# Patient Record
Sex: FEMALE | Race: WHITE
[De-identification: names, ages, dates, MRNs, and addresses within clinical notes are randomized per-mention and may not be internally consistent; named-entity substitution may affect disease eponyms.]

---

## 2018-02-14 ENCOUNTER — HOSPITAL ENCOUNTER (OUTPATIENT)
Dept: HOSPITAL 88 - CT | Age: 83
End: 2018-02-14
Payer: MEDICARE

## 2018-02-14 DIAGNOSIS — R27.9: Primary | ICD-10-CM

## 2018-02-14 DIAGNOSIS — H40.89: ICD-10-CM

## 2018-02-14 DIAGNOSIS — H54.62: ICD-10-CM

## 2018-02-14 PROCEDURE — 70450 CT HEAD/BRAIN W/O DYE: CPT

## 2018-02-14 PROCEDURE — 70480 CT ORBIT/EAR/FOSSA W/O DYE: CPT

## 2018-02-14 NOTE — DIAGNOSTIC IMAGING REPORT
EXAMINATION: CT of the orbits without contrast



CLINICAL HISTORY: Loss of vision left eye, evaluate for stroke, unsteady gait

COMPARISON:Head CT performed on the same day

TECHNIQUE: The orbits were scanned utilizing a multidetector helical scanner

from the skull base to the vertex. Multiplanar sagittal and coronal

reconstructions were performed as well.



FINDINGS:



     Globes: Normal

     Optic nerves: Normal size. 

     Orbital fat: Normal.

     Extraocular muscles: Normal.

     Preseptal soft tissues: Normal.

     Lacrimal glands: Normal.

     Sella turcica: No enlargement.

     Cavernous sinuses: No abnormalities in this unenhanced study.

     Paranasal sinuses: Clear.

     Superior ophthalmic veins: Grossly normal abnormalities, no enlarged





IMPRESSION:



No orbits abnormalities to explain the patient's symptoms.





Signed by: Dr. Merry Vazquez M.D. on 2/14/2018 2:11 PM

## 2018-02-14 NOTE — DIAGNOSTIC IMAGING REPORT
EXAMINATION: Head CT 



HISTORY: Loss of vision left eye, evaluate for stroke, unsteady gait

COMPARISON: None.

TECHNIQUE: Multidetector axial images were obtained without contrast from the

foramen magnum to the vertex . The images were reconstructed using brain and

bone algorithms.  Thin section brain images were reformatted into coronal and

sagittal planes.

Intravenous contrast: None. 

Motion/streaking artifact limits the evaluation of the skull base and posterior

cranial fossa.



FINDINGS:



     Parenchyma: 

1.  Severe confluent periventricular, corona radiata and centrum semiovale

white matter hypodensities, most likely nonspecific microvascular ischemic

changes.

2.  No mass or hemorrhage. No CT evidence of acute territorial vascular insult.



    

     Extra-axial spaces:No abnormal density. No extra-axial fluid collections 

     Brain volume: Normal for age. 

     Ventricles: No hydrocephalus or displacement.  

     Arteries: No density suggestive of thrombus. 

     Dural sinuses: No abnormal density. 

     Extra-axial spaces: No abnormal density. 

     Foramen magnum: No mass, Chiari malformation, or basilar invagination. 

     Sella: No obvious mass.  

     Paranasal/mastoid sinuses: Imaged portions unremarkable. 

     Skull/Scalp: No lytic or blastic lesions.  No fractures. 



IMPRESSION:



1.  No  acute intracranial hemorrhage or cortical infarcts.



2.  Severe confluent white matter chronic microvascular ischemic changes,

superimposed acute subcortical infarct cannot be excluded. If clinical concern

consider brain MRI for further evaluation.



Signed by: Dr. Merry Vazquez M.D. on 2/14/2018 2:08 PM

## 2020-03-30 ENCOUNTER — HOSPITAL ENCOUNTER (INPATIENT)
Dept: HOSPITAL 88 - ER | Age: 85
LOS: 4 days | Discharge: NURSING, REHAB OR LONG-TERM CARE | DRG: 689 | End: 2020-04-03
Attending: INTERNAL MEDICINE | Admitting: INTERNAL MEDICINE
Payer: MEDICARE

## 2020-03-30 VITALS — DIASTOLIC BLOOD PRESSURE: 85 MMHG | SYSTOLIC BLOOD PRESSURE: 195 MMHG

## 2020-03-30 VITALS — DIASTOLIC BLOOD PRESSURE: 80 MMHG | SYSTOLIC BLOOD PRESSURE: 140 MMHG

## 2020-03-30 VITALS — WEIGHT: 147 LBS | HEIGHT: 60 IN | BODY MASS INDEX: 28.86 KG/M2

## 2020-03-30 VITALS — DIASTOLIC BLOOD PRESSURE: 93 MMHG | SYSTOLIC BLOOD PRESSURE: 189 MMHG

## 2020-03-30 VITALS — SYSTOLIC BLOOD PRESSURE: 189 MMHG | DIASTOLIC BLOOD PRESSURE: 93 MMHG

## 2020-03-30 VITALS — SYSTOLIC BLOOD PRESSURE: 195 MMHG | DIASTOLIC BLOOD PRESSURE: 85 MMHG

## 2020-03-30 VITALS — SYSTOLIC BLOOD PRESSURE: 194 MMHG | DIASTOLIC BLOOD PRESSURE: 93 MMHG

## 2020-03-30 DIAGNOSIS — I11.0: ICD-10-CM

## 2020-03-30 DIAGNOSIS — N39.0: Primary | ICD-10-CM

## 2020-03-30 DIAGNOSIS — F03.91: ICD-10-CM

## 2020-03-30 DIAGNOSIS — G93.41: ICD-10-CM

## 2020-03-30 DIAGNOSIS — F29: ICD-10-CM

## 2020-03-30 DIAGNOSIS — I50.22: ICD-10-CM

## 2020-03-30 LAB
ALBUMIN SERPL-MCNC: 4.3 G/DL (ref 3.5–5)
ALBUMIN/GLOB SERPL: 1.3 {RATIO} (ref 0.8–2)
ALP SERPL-CCNC: 103 IU/L (ref 40–150)
ALT SERPL-CCNC: 11 IU/L (ref 0–55)
ANION GAP SERPL CALC-SCNC: 13.4 MMOL/L (ref 8–16)
BACTERIA URNS QL MICRO: (no result) /HPF
BASOPHILS # BLD AUTO: 0 10*3/UL (ref 0–0.1)
BASOPHILS NFR BLD AUTO: 0.3 % (ref 0–1)
BILIRUB UR QL: NEGATIVE
BUN SERPL-MCNC: 13 MG/DL (ref 7–26)
BUN/CREAT SERPL: 15 (ref 6–25)
CALCIUM SERPL-MCNC: 9.5 MG/DL (ref 8.4–10.2)
CHLORIDE SERPL-SCNC: 97 MMOL/L (ref 98–107)
CLARITY UR: (no result)
CO2 SERPL-SCNC: 28 MMOL/L (ref 22–29)
COLOR UR: YELLOW
DEPRECATED APTT PLAS QN: 35 SECONDS (ref 23.8–35.5)
DEPRECATED INR PLAS: 0.96
DEPRECATED NEUTROPHILS # BLD AUTO: 11 10*3/UL (ref 2.1–6.9)
DEPRECATED RBC URNS MANUAL-ACNC: (no result) /HPF (ref 0–5)
EGFRCR SERPLBLD CKD-EPI 2021: > 60 ML/MIN (ref 60–?)
EOSINOPHIL # BLD AUTO: 0 10*3/UL (ref 0–0.4)
EOSINOPHIL NFR BLD AUTO: 0.2 % (ref 0–6)
EPI CELLS URNS QL MICRO: (no result) /LPF
ERYTHROCYTE [DISTWIDTH] IN CORD BLOOD: 13.5 % (ref 11.7–14.4)
GLOBULIN PLAS-MCNC: 3.2 G/DL (ref 2.3–3.5)
GLUCOSE SERPLBLD-MCNC: 98 MG/DL (ref 74–118)
HCT VFR BLD AUTO: 42.4 % (ref 34.2–44.1)
HGB BLD-MCNC: 13.7 G/DL (ref 12–16)
KETONES UR QL STRIP.AUTO: (no result)
LEUKOCYTE ESTERASE UR QL STRIP.AUTO: (no result)
LYMPHOCYTES # BLD: 1.4 10*3/UL (ref 1–3.2)
LYMPHOCYTES NFR BLD AUTO: 10.3 % (ref 18–39.1)
MCH RBC QN AUTO: 28.6 PG (ref 28–32)
MCHC RBC AUTO-ENTMCNC: 32.3 G/DL (ref 31–35)
MCV RBC AUTO: 88.5 FL (ref 81–99)
MONOCYTES # BLD AUTO: 0.8 10*3/UL (ref 0.2–0.8)
MONOCYTES NFR BLD AUTO: 5.7 % (ref 4.4–11.3)
NEUTS SEG NFR BLD AUTO: 83 % (ref 38.7–80)
NITRITE UR QL STRIP.AUTO: POSITIVE
PLATELET # BLD AUTO: 240 X10E3/UL (ref 140–360)
POTASSIUM SERPL-SCNC: 4.4 MMOL/L (ref 3.5–5.1)
PROT UR QL STRIP.AUTO: (no result)
PROTHROMBIN TIME: 13.3 SECONDS (ref 11.9–14.5)
RBC # BLD AUTO: 4.79 X10E6/UL (ref 3.6–5.1)
SODIUM SERPL-SCNC: 134 MMOL/L (ref 136–145)
SP GR UR STRIP: 1.02 (ref 1.01–1.02)
UROBILINOGEN UR STRIP-MCNC: 0.2 MG/DL (ref 0.2–1)

## 2020-03-30 PROCEDURE — 73522 X-RAY EXAM HIPS BI 3-4 VIEWS: CPT

## 2020-03-30 PROCEDURE — 84443 ASSAY THYROID STIM HORMONE: CPT

## 2020-03-30 PROCEDURE — 93306 TTE W/DOPPLER COMPLETE: CPT

## 2020-03-30 PROCEDURE — 87040 BLOOD CULTURE FOR BACTERIA: CPT

## 2020-03-30 PROCEDURE — 99251: CPT

## 2020-03-30 PROCEDURE — 99284 EMERGENCY DEPT VISIT MOD MDM: CPT

## 2020-03-30 PROCEDURE — 87086 URINE CULTURE/COLONY COUNT: CPT

## 2020-03-30 PROCEDURE — 80048 BASIC METABOLIC PNL TOTAL CA: CPT

## 2020-03-30 PROCEDURE — 83735 ASSAY OF MAGNESIUM: CPT

## 2020-03-30 PROCEDURE — 84100 ASSAY OF PHOSPHORUS: CPT

## 2020-03-30 PROCEDURE — 72131 CT LUMBAR SPINE W/O DYE: CPT

## 2020-03-30 PROCEDURE — 83880 ASSAY OF NATRIURETIC PEPTIDE: CPT

## 2020-03-30 PROCEDURE — 36415 COLL VENOUS BLD VENIPUNCTURE: CPT

## 2020-03-30 PROCEDURE — 85025 COMPLETE CBC W/AUTO DIFF WBC: CPT

## 2020-03-30 PROCEDURE — 72192 CT PELVIS W/O DYE: CPT

## 2020-03-30 PROCEDURE — 85610 PROTHROMBIN TIME: CPT

## 2020-03-30 PROCEDURE — 85730 THROMBOPLASTIN TIME PARTIAL: CPT

## 2020-03-30 PROCEDURE — 80053 COMPREHEN METABOLIC PANEL: CPT

## 2020-03-30 PROCEDURE — 83036 HEMOGLOBIN GLYCOSYLATED A1C: CPT

## 2020-03-30 PROCEDURE — 87186 SC STD MICRODIL/AGAR DIL: CPT

## 2020-03-30 PROCEDURE — 96360 HYDRATION IV INFUSION INIT: CPT

## 2020-03-30 PROCEDURE — 81001 URINALYSIS AUTO W/SCOPE: CPT

## 2020-03-30 PROCEDURE — 97139 UNLISTED THERAPEUTIC PX: CPT

## 2020-03-30 PROCEDURE — 96361 HYDRATE IV INFUSION ADD-ON: CPT

## 2020-03-30 PROCEDURE — 71045 X-RAY EXAM CHEST 1 VIEW: CPT

## 2020-03-30 RX ADMIN — SIMVASTATIN SCH MG: 40 TABLET, FILM COATED ORAL at 21:30

## 2020-03-30 RX ADMIN — LORAZEPAM PRN MG: 2 INJECTION INTRAMUSCULAR; INTRAVENOUS at 18:35

## 2020-03-30 RX ADMIN — METOPROLOL TARTRATE SCH MG: 25 TABLET, FILM COATED ORAL at 21:30

## 2020-03-30 RX ADMIN — CARBIDOPA AND LEVODOPA SCH EA: 10; 100 TABLET ORAL at 14:17

## 2020-03-30 RX ADMIN — CEFTRIAXONE SCH MLS/HR: 100 INJECTION, POWDER, FOR SOLUTION INTRAVENOUS at 13:15

## 2020-03-30 RX ADMIN — Medication SCH DROP: at 21:30

## 2020-03-30 RX ADMIN — SODIUM CHLORIDE SCH MLS/HR: 9 INJECTION, SOLUTION INTRAVENOUS at 09:00

## 2020-03-30 RX ADMIN — METOPROLOL TARTRATE SCH MG: 25 TABLET, FILM COATED ORAL at 13:15

## 2020-03-30 RX ADMIN — SODIUM CHLORIDE SCH MLS/HR: 9 INJECTION, SOLUTION INTRAVENOUS at 22:11

## 2020-03-30 RX ADMIN — CARBIDOPA AND LEVODOPA SCH EA: 10; 100 TABLET ORAL at 21:30

## 2020-03-30 NOTE — DIAGNOSTIC IMAGING REPORT
EXAM: CT Pelvis WITHOUT contrast  

INDICATION: r/o fracture



COMPARISON: None.

TECHNIQUE: Pelvis were scanned utilizing a multidetector helical scanner from

the iliac crest to the pubic symphysis without administration of IV contrast.

Coronal and sagittal reformations were obtained. Routine protocol was

performed. 

     IV CONTRAST: None

     ORAL CONTRAST: Water

            

COMPLICATIONS: None



RADIATION DOSE:

     Total DLP: 1058.87 mGy*cm

     Estimated effective dose: (DLP x 0.015 x size factor) mSv

     CTDIvol has been reviewed. It is below the limits set by the Radiation

Protocol Committee (RPC).



FINDINGS:



LINES and TUBES: There is a Roman catheter in place.



GI TRACT: No abnormal distention, wall thickening, or evidence of bowel

obstruction.



PELVIC ORGANS/BLADDER: Calcified leiomyomatous uterus. A pessary device is in

place.



LYMPH NODES: No lymphadenopathy.



VESSELS: Aortobiiliac stent in place.



PERITONEUM / RETROPERITONEUM: No free air or fluid.



BONES: There is a segmental mildly displaced fracture of the right inferior

pubic ramus/ischium with half shaft width lateral displacement of the fracture

fragment.



SOFT TISSUES: Unremarkable.            



IMPRESSION: 

Mildly displaced fracture of the right inferior pubic ramus.



Signed by: Erich Pereyra MD on 3/30/2020 8:28 PM

## 2020-03-30 NOTE — NUR
Patient arrived to the unit @ 1012 from the ER via stretcher.  Patient in stable condition, 
no s/s of distress. No complaints of pain. Air pump applied. Upon assessment heel protectors 
applied bilaterally. Bilateral bruising noted to the hands. Left heel blanchable redness 
noted. IV fluids infusing. Bed low and locked. Bed alarm in place and working. call light 
within reach.

## 2020-03-30 NOTE — NUR
PATIENT RETURN TO THE UNIT FROM RADIOLOGY- IN STABLE CONDITION WITH NO S/S OF RESPIRATORY 
DISTRESS. NO PAIN INDICATED. TELEMETRY APPLIED. PEREZ INTACT AND DRAINING. HEEL PROTECTORS 
APPLIED BILATERALLY. BED ALARM APPLIED; 3 RAILS RAISED. CALL LIGHT IS WITHIN REACH, PATIENT 
INSTRUCTED TO CALL FOR ASSISTANCE AS NEEDED. BEDSIDE SHIFT REPORT GIVEN TO ONCOMING NURSE.

## 2020-03-30 NOTE — NUR
incontinence care provided for urine for patient with assistance x1. Patient 
placed into clean adult brief and hospital gown and clean desiree placed under 
patient. Also placed non slip socks on patient.

## 2020-03-30 NOTE — DIAGNOSTIC IMAGING REPORT
EXAMINATION:  CHEST SINGLE (PORTABLE)    



INDICATION:      

^r/o PNA

^[id number]

^1900.    



COMPARISON:  None

     

FINDINGS:  AP view   



TUBES and LINES:  None.



LUNGS/PLEURA: There is left basilar opacity which could be due to combination

of atelectasis or effusion.



:  No pleural effusion or pneumothorax.



HEART AND MEDIASTINUM:  The cardiomediastinal silhouette is unremarkable.    



BONES AND SOFT TISSUES:  No acute osseous lesion.  Soft tissues are

unremarkable.



UPPER ABDOMEN: No free air under the diaphragm.    



IMPRESSION: 

Left basilar opacity which could be due to combination of atelectasis or

effusion.



Signed by: Erich Pereyra MD on 3/30/2020 8:30 PM

## 2020-03-30 NOTE — DIAGNOSTIC IMAGING REPORT
Bilateral hips, 2 views



History:Status post fall



Comparison:None



Findings:

Acute fracture of the right inferior pubic ramus is noted. No additional acute

fracture is identified. Moderate underlying osteopenia. Advanced peripheral

vascular calcifications. No additional significant bone or joint space

abnormality.



Impression:

Acute fracture of the right inferior pubic ramus.



Signed by: Kwasi Mcclain MD on 3/30/2020 9:00 AM

## 2020-03-30 NOTE — NUR
ST  Note:



Order for bedside swallow eval noted. Discussed case with NETO Arzola.  Pt NPO for procedure. 
 Will complete eval when able.

## 2020-03-30 NOTE — NUR
ORTHOPEDIC CONSULTATION



85 year old female who is a poor historian due to her dementia and parkinsons 
with complaints of right hip pain.  She denies any numbness, paresthesias or 
loss of distal motor function.  Complains of some mild low back pain. 



PMdHx: Parkinsons, Demential, Anxiety, GERD

Allergies: NKDA

SurgHx: Unable to obtain

FamHx: Non-contributory

SocHx: Neg Tob, EtOH, Drugs



VS T 98.4 HR 85 RR 18 /93 O2 96%



Bilateral lower legs & back - no open lesions or sores, no gross deformity

TTP of Right greater trochanter and superior and inferior pubic rami

Motor: +EHL, FHL, TA, G/S

Sensation grossly intact

Pulses + DP, Post tib

Compartments soft

Negative calf tenderness

Unable to straight leg raise.  Pain with heel strike



XRays - acute right superior and inferior pubic rami fracture



85 year old female with right superior and inferior pubic rami fracture

Plan for CT of Pelvis and Low back

Analgesics

DVT Prophylaxis

If CT demonstrates only superior and inferior pubic rami fracture, patient is 
WBAT

Plan for Physical Therapy ASAP





Ashleigh Garibay, DO

[facility]

## 2020-03-30 NOTE — NUR
PEREZ INSERTED AT 1340- URINE SAMPLE COLLECTED AND SENT TO LAB. DIAPER APPLIED. PATIENT 
REPOSITION- BED ALARM APPLIED AND 3 SIDE RAILS RAISED. CALL LIGHT WITHIN REACH- PATIENT 
INSTRUCTED TO CALL FOR ASSISTANCE AS NEEDED.

## 2020-03-31 VITALS — SYSTOLIC BLOOD PRESSURE: 171 MMHG | DIASTOLIC BLOOD PRESSURE: 87 MMHG

## 2020-03-31 VITALS — SYSTOLIC BLOOD PRESSURE: 135 MMHG | DIASTOLIC BLOOD PRESSURE: 69 MMHG

## 2020-03-31 VITALS — DIASTOLIC BLOOD PRESSURE: 69 MMHG | SYSTOLIC BLOOD PRESSURE: 135 MMHG

## 2020-03-31 VITALS — DIASTOLIC BLOOD PRESSURE: 73 MMHG | SYSTOLIC BLOOD PRESSURE: 183 MMHG

## 2020-03-31 VITALS — SYSTOLIC BLOOD PRESSURE: 157 MMHG | DIASTOLIC BLOOD PRESSURE: 90 MMHG

## 2020-03-31 VITALS — SYSTOLIC BLOOD PRESSURE: 172 MMHG | DIASTOLIC BLOOD PRESSURE: 70 MMHG

## 2020-03-31 VITALS — DIASTOLIC BLOOD PRESSURE: 79 MMHG | SYSTOLIC BLOOD PRESSURE: 161 MMHG

## 2020-03-31 LAB
ALBUMIN SERPL-MCNC: 3.7 G/DL (ref 3.5–5)
ALBUMIN/GLOB SERPL: 1.2 {RATIO} (ref 0.8–2)
ALP SERPL-CCNC: 88 IU/L (ref 40–150)
ALT SERPL-CCNC: < 6 IU/L (ref 0–55)
ANION GAP SERPL CALC-SCNC: 12.6 MMOL/L (ref 8–16)
BASOPHILS # BLD AUTO: 0 10*3/UL (ref 0–0.1)
BASOPHILS NFR BLD AUTO: 0.3 % (ref 0–1)
BNP BLD-MCNC: 315.4 PG/ML (ref 0–100)
BUN SERPL-MCNC: 11 MG/DL (ref 7–26)
BUN/CREAT SERPL: 15 (ref 6–25)
CALCIUM SERPL-MCNC: 8.8 MG/DL (ref 8.4–10.2)
CHLORIDE SERPL-SCNC: 98 MMOL/L (ref 98–107)
CO2 SERPL-SCNC: 25 MMOL/L (ref 22–29)
DEPRECATED NEUTROPHILS # BLD AUTO: 8.1 10*3/UL (ref 2.1–6.9)
EGFRCR SERPLBLD CKD-EPI 2021: > 60 ML/MIN (ref 60–?)
EOSINOPHIL # BLD AUTO: 0 10*3/UL (ref 0–0.4)
EOSINOPHIL NFR BLD AUTO: 0.3 % (ref 0–6)
ERYTHROCYTE [DISTWIDTH] IN CORD BLOOD: 13.5 % (ref 11.7–14.4)
GLOBULIN PLAS-MCNC: 3.1 G/DL (ref 2.3–3.5)
GLUCOSE SERPLBLD-MCNC: 93 MG/DL (ref 74–118)
HCT VFR BLD AUTO: 38.8 % (ref 34.2–44.1)
HGB BLD-MCNC: 12.6 G/DL (ref 12–16)
LYMPHOCYTES # BLD: 1.1 10*3/UL (ref 1–3.2)
LYMPHOCYTES NFR BLD AUTO: 10.6 % (ref 18–39.1)
MCH RBC QN AUTO: 28.7 PG (ref 28–32)
MCHC RBC AUTO-ENTMCNC: 32.5 G/DL (ref 31–35)
MCV RBC AUTO: 88.4 FL (ref 81–99)
MONOCYTES # BLD AUTO: 0.7 10*3/UL (ref 0.2–0.8)
MONOCYTES NFR BLD AUTO: 7.4 % (ref 4.4–11.3)
NEUTS SEG NFR BLD AUTO: 81.2 % (ref 38.7–80)
PLATELET # BLD AUTO: 195 X10E3/UL (ref 140–360)
POTASSIUM SERPL-SCNC: 3.6 MMOL/L (ref 3.5–5.1)
RBC # BLD AUTO: 4.39 X10E6/UL (ref 3.6–5.1)
SODIUM SERPL-SCNC: 132 MMOL/L (ref 136–145)
TSH SERPL DL<=0.005 MIU/L-ACNC: 2.23 UIU/ML (ref 0.35–4.94)

## 2020-03-31 RX ADMIN — LORAZEPAM PRN MG: 2 INJECTION INTRAMUSCULAR; INTRAVENOUS at 15:53

## 2020-03-31 RX ADMIN — METOPROLOL TARTRATE SCH MG: 25 TABLET, FILM COATED ORAL at 08:25

## 2020-03-31 RX ADMIN — SODIUM CHLORIDE SCH MLS/HR: 9 INJECTION, SOLUTION INTRAVENOUS at 16:43

## 2020-03-31 RX ADMIN — TIMOLOL MALEATE SCH DROP: 5 SOLUTION OPHTHALMIC at 08:23

## 2020-03-31 RX ADMIN — AMLODIPINE BESYLATE SCH MG: 10 TABLET ORAL at 18:21

## 2020-03-31 RX ADMIN — SODIUM CHLORIDE TAB 1 GM SCH GM: 1 TAB at 21:39

## 2020-03-31 RX ADMIN — HYDRALAZINE HYDROCHLORIDE PRN MG: 20 INJECTION INTRAMUSCULAR; INTRAVENOUS at 11:24

## 2020-03-31 RX ADMIN — EZETIMIBE SCH MG: 10 TABLET ORAL at 08:25

## 2020-03-31 RX ADMIN — METOPROLOL TARTRATE SCH MG: 25 TABLET, FILM COATED ORAL at 21:39

## 2020-03-31 RX ADMIN — SIMVASTATIN SCH MG: 40 TABLET, FILM COATED ORAL at 21:39

## 2020-03-31 RX ADMIN — Medication SCH DROP: at 21:38

## 2020-03-31 RX ADMIN — LORAZEPAM SCH MG: 0.5 TABLET ORAL at 21:38

## 2020-03-31 RX ADMIN — CEFTRIAXONE SCH MLS/HR: 100 INJECTION, POWDER, FOR SOLUTION INTRAVENOUS at 12:10

## 2020-03-31 RX ADMIN — CARBIDOPA AND LEVODOPA SCH EA: 10; 100 TABLET ORAL at 14:53

## 2020-03-31 RX ADMIN — PANTOPRAZOLE SODIUM SCH MG: 40 TABLET, DELAYED RELEASE ORAL at 08:23

## 2020-03-31 RX ADMIN — CITALOPRAM HYDROBROMIDE SCH MG: 20 TABLET, FILM COATED ORAL at 08:25

## 2020-03-31 RX ADMIN — CARBIDOPA AND LEVODOPA SCH EA: 10; 100 TABLET ORAL at 08:25

## 2020-03-31 RX ADMIN — HYDRALAZINE HYDROCHLORIDE PRN MG: 20 INJECTION INTRAMUSCULAR; INTRAVENOUS at 05:45

## 2020-03-31 RX ADMIN — CARBIDOPA AND LEVODOPA SCH EA: 10; 100 TABLET ORAL at 21:39

## 2020-03-31 NOTE — NUR
PATIENT IN STABLE CONDITION WITH NO S/S OF RESPIRATORY DISTRESS. NO PAIN INDICATED. 
TELEMETRY APPLIED. PEREZ INTACT AND DRAINING; DIAPER APPLIED. HEEL PROTECTORS APPLIED 
BILATERALLY. BED ALARM APPLIED; 3 SIDE RAILS RAISED. PATIENT TURNED TO HER RIGHT SIDE. CALL 
LIGHT IS WITHIN REACH, PATIENT INSTRUCTED TO CALL FOR ASSISTANCE AS NEEDED. BEDSIDE SHIFT 
REPORT GIVEN TO ONCOMING NURSE.

## 2020-03-31 NOTE — NUR
Pt sleeping soundly and no family present.   left a card describing availability of 
 and instructions on how to contact a .



KENNEDY DEWITT



Spiritual Care Department

O: [phone]

## 2020-03-31 NOTE — NUR
Bedside report and rounding completed with oncoming nurse. Patient in bed with call light 
within reach. Bed locked and in lowest position. No issues or concerns noted.

## 2020-03-31 NOTE — DIAGNOSTIC IMAGING REPORT
History: Right hip pain, rule out fracture.

Comparison studies: Same day pelvic CT.



Technique: 

Axial images were obtained through the lumbar spine.

Coronal and sagittal images reconstructed from the axial data. Dose modulation,

iterative reconstruction, and/or weight based adjustment of the mA/kV was

utilized to reduce the radiation dose to as low as reasonably achievable. 

Intravenous contrast: None



Findings:



Number of non-rib bearing vertebral bodies: 5



Alignment: S-shaped lumbar curvature with lower lumbar dextrocurvature and

upper lumbar levocurvature. Minimal Grade 1 retrolisthesis of T11 on T12 and L1

on L2 and 5 mm Grade 1 anterolisthesis of L4 on L5 and 7 mm Grade 1

anterolisthesis of L5 on S1.



Soft tissues: No gross acute abnormalities.







Paraspinal muscles: Fatty-replaced atrophic changes, worse/severe at the

lumbosacral junction.



Sacroiliac joints: Mild degenerative changes bilaterally.



Vertebrae:  

Bones are demineralized. No destructive lytic or blastic lesions or evidence of

infection. No acute lumbar spine fracture. The T12 and L1 vertebral bodies are

fused. Mild chronic vertebral body height loss present at T11.



Degenerative changes:



Hypertrophic lumbar spinous processes which articulate with degenerative there

are in a configuration which can be seen with Baastrup's disease.



T10-T11: 

Severely degenerated disc with chronic endplate changes.



T11-T12: 

Severely degenerated disc with sclerotic degenerative endplate changes. MRN

retrolisthesis of T11 on T12 and facet arthrosis result in moderate canal

stenosis and severe bilateral foraminal stenosis.



T12-L1: 

Fused disc space with moderate right foraminal stenosis due to facet arthrosis.



L1-L2:

Moderately degenerated disc. Minimal retrolisthesis of L1 on L2 with associated

disc osteophyte complex and facet arthrosis with moderate bilateral foraminal

stenosis.



L2-L3: 

Mildly degenerated disc with bilateral facet arthrosis without significant

canal or foraminal stenosis.



L3-L4:

Mildly degenerated disc. Disc bulge, thickened ligamentum flavum and bilateral

facet arthrosis with mild canal stenosis at moderate left and mild right

foraminal stenosis.



L4-L5:

Severely degenerated disc. Grade 1 anterolisthesis of L4 on L5 with associated

uncovered disc/disc osteophyte complex, thickened ligamentum flavum and severe

facet arthrosis with severe canal stenosis and severe bilateral foraminal

stenosis.



L5-S1:

Severely degenerated disc. Grade 1 anterolisthesis of L5 on S1 with associated

uncovered disc/disc osteophyte complex, thickened ligamentum flavum and severe

bilateral facet arthrosis with moderate canal stenosis and severe bilateral

foraminal stenosis.



Sacroiliac joints: 

Degenerative changes present at the SI joints bilaterally. 

Nondisplaced right lateral sacral fracture which may extend to the sacroiliac

joint inferiorly.



Incidental findings:

*  Moderate scattered calcified atherosclerosis with aortoiliac stent graft in

place (cannot evaluate vessel/stent patency on this noncontrast exam).

*  Partially imaged calcified uterine fibroids and pessary.

*  Right upper quadrant cholecystectomy clips.

*  Partially imaged calcified granuloma along the right lobe of the liver.

*  A 2.5 cm right adrenal nodule is favored to be an adenoma. CT or MRI,

adrenal mass protocol, may provide more definitive diagnosis, as warranted.



IMPRESSION:



1.  No acute lumbar spine fracture.

2.  Nondisplaced right lateral sacral fracture.

3.  Advanced multilevel degenerative changes with advanced multilevel disc

degeneration facet arthrosis and multilevel degenerative listhesis.

4.  Degenerative canal stenosis, moderate at T11-T12, severe at L4-L5 and

moderate L5-S1.

5.  Varying degrees of moderate to severe multilevel foraminal stenosis as

described.

6.  Incidental findings as described.





Signed by: Dr. Fred Sevilal M.D. on 3/31/2020 8:00 AM

## 2020-03-31 NOTE — NUR
Nutrition Intervention Note



RD Recommendation(s) for Physician: 

- Continue current diet per SLP rec's 

- Recommend Ensure Compact TID for adequacy

- MVI with minerals once daily for adequacy 



Plan of Care: RD following, monitoring for tolerance and adequacy, ONS rec's 



Nutrition reason for involvement:

Nutrition Risk Trigger- MST2



RD Assessment

3/31: 85 YOF admitted s/p fall with R hip pain and probable fx. Pt seen today per MST 
screen, pt sleeping and per chart poor historian 2/2 dementia. Pt appears well nourished, no 
family present at bedside. Spoke with RN Ness, pt seen by SLP today and she recommended 
pureed diet. Per RN pt not eating well, but will drink liquids- recommended Ensure Compat 
TID, RN to order. Chart reviewed. Will continue to monitor. 



Principal Problems/Diagnoses: R hip pain s/p fall



PMH: Parkinson's, dementia, GERD 



GI: LBM 3/31



Skin: skin intact 



Labs: 3/31: Na 132, K 3.6, BUN 11, Cr 0.74, Gluc 93, A1C 4.9



Meds: sinemet, abx, zetia, protonix, zocor, zofran, morphine



Ht: 60 in

Wt: 147 lb

BMI: 28.7 kg/m2

IBW: 100 lb



Malnutrition Evaluation (3/31/20)

The patient does not meet criteria for a specified degree of malnutrition at this time. Will 
re-evaluate at follow-up as appropriate. 

Unable to complete assessment, pt unable to provide hx. 



Energy intake:

Unable to assess

Weight loss:

Unable to assess

Fat loss: none, ample tricept fold thickness 

Muscle loss: none, shoulder round- age related changes noted

Supporting Evidence:

Fluid accumulation: none

Functional Status: not assessed



Nutrition Prescription (Diet Order): Pureed 



Estimated Nutritional Needs:

0510-3211 calories/day (18-22 kcal/kg CBW)

 g protein/day (1-1.5 g pro/kg CBW)





Diet Adequacy:

Not meeting calorie needs, Not meeting protein needs

Diet Tolerance: tolerating po 



Diet Education Needs Assessment: 

Diet education not indicated at this time, modified texture diet and pt with hx of dementia 





Nutrition Care Level: Mod



Nutrition Diagnosis: Inadequate energy and protein intake related to current medical 
conditions as evidenced by not meeting needs. 





Goal: Patient will meet % of estimated needs by follow up 

Progress: N/A



Interventions:

- texture modified diet, Commercial beverage, Prescription medications, Multivitamin/mineral 
therapy, Collaboration with other providers



Monitoring/Evaluation:

- Total energy intake, Total protein intake, Modified diet, Liquid supplement, Weight change





Signed: Concetta Henson RD, LD, [facility]

## 2020-03-31 NOTE — NUR
Bedside report and rounding completed with offgoing nurse. Patient in bed with call light 
within reach. Bed locked and in lowest position, bed alarm on and active. No issues or 
concerns noted. Quality 130: Documentation Of Current Medications In The Medical Record: Current Medications Documented Detail Level: Detailed Quality 226: Preventive Care And Screening: Tobacco Use: Screening And Cessation Intervention: Patient screened for tobacco use and is an ex/non-smoker Quality 431: Preventive Care And Screening: Unhealthy Alcohol Use - Screening: Patient screened for unhealthy alcohol use using a single question and scores less than 2 times per year Quality 110: Preventive Care And Screening: Influenza Immunization: Influenza Immunization Administered during Influenza season

## 2020-03-31 NOTE — NUR
PATIENT IS ALERT TO SELF -REMAINS IN STABLE CONDITION WITH NO S/S OF RESPIRATORY DISTRESS. 
NO PAIN INDICATED. IV FLUIDS INFUSING. TELEMETRY APPLIED. PEREZ INTACT AND DRAINING; DIAPER 
APPLIED. BED ALARM AND 3 SIDE RAILS APPLIED. CALL LIGHT IS WITHIN REACH, PATIENT INSTRUCTED 
TO CALL FOR ASSISTANCE AS NEEDED.

## 2020-04-01 VITALS — DIASTOLIC BLOOD PRESSURE: 70 MMHG | SYSTOLIC BLOOD PRESSURE: 146 MMHG

## 2020-04-01 VITALS — SYSTOLIC BLOOD PRESSURE: 92 MMHG | DIASTOLIC BLOOD PRESSURE: 53 MMHG

## 2020-04-01 VITALS — DIASTOLIC BLOOD PRESSURE: 90 MMHG | SYSTOLIC BLOOD PRESSURE: 177 MMHG

## 2020-04-01 VITALS — DIASTOLIC BLOOD PRESSURE: 87 MMHG | SYSTOLIC BLOOD PRESSURE: 177 MMHG

## 2020-04-01 VITALS — SYSTOLIC BLOOD PRESSURE: 177 MMHG | DIASTOLIC BLOOD PRESSURE: 82 MMHG

## 2020-04-01 VITALS — SYSTOLIC BLOOD PRESSURE: 132 MMHG | DIASTOLIC BLOOD PRESSURE: 71 MMHG

## 2020-04-01 VITALS — SYSTOLIC BLOOD PRESSURE: 142 MMHG | DIASTOLIC BLOOD PRESSURE: 74 MMHG

## 2020-04-01 LAB
ANION GAP SERPL CALC-SCNC: 13.3 MMOL/L (ref 8–16)
BASOPHILS # BLD AUTO: 0 10*3/UL (ref 0–0.1)
BASOPHILS NFR BLD AUTO: 0.3 % (ref 0–1)
BUN SERPL-MCNC: 14 MG/DL (ref 7–26)
BUN/CREAT SERPL: 19 (ref 6–25)
CALCIUM SERPL-MCNC: 8.9 MG/DL (ref 8.4–10.2)
CHLORIDE SERPL-SCNC: 101 MMOL/L (ref 98–107)
CO2 SERPL-SCNC: 22 MMOL/L (ref 22–29)
DEPRECATED NEUTROPHILS # BLD AUTO: 9.9 10*3/UL (ref 2.1–6.9)
DEPRECATED PHOSPHATE SERPL-MCNC: 2.4 MG/DL (ref 2.3–4.7)
EGFRCR SERPLBLD CKD-EPI 2021: > 60 ML/MIN (ref 60–?)
EOSINOPHIL # BLD AUTO: 0 10*3/UL (ref 0–0.4)
EOSINOPHIL NFR BLD AUTO: 0 % (ref 0–6)
ERYTHROCYTE [DISTWIDTH] IN CORD BLOOD: 13.7 % (ref 11.7–14.4)
GLUCOSE SERPLBLD-MCNC: 101 MG/DL (ref 74–118)
HCT VFR BLD AUTO: 38.2 % (ref 34.2–44.1)
HGB BLD-MCNC: 12.5 G/DL (ref 12–16)
LYMPHOCYTES # BLD: 0.8 10*3/UL (ref 1–3.2)
LYMPHOCYTES NFR BLD AUTO: 6.6 % (ref 18–39.1)
MAGNESIUM SERPL-MCNC: 1.7 MG/DL (ref 1.3–2.1)
MCH RBC QN AUTO: 28.8 PG (ref 28–32)
MCHC RBC AUTO-ENTMCNC: 32.7 G/DL (ref 31–35)
MCV RBC AUTO: 88 FL (ref 81–99)
MONOCYTES # BLD AUTO: 1.1 10*3/UL (ref 0.2–0.8)
MONOCYTES NFR BLD AUTO: 8.9 % (ref 4.4–11.3)
NEUTS SEG NFR BLD AUTO: 83.8 % (ref 38.7–80)
PLATELET # BLD AUTO: 182 X10E3/UL (ref 140–360)
POTASSIUM SERPL-SCNC: 3.3 MMOL/L (ref 3.5–5.1)
RBC # BLD AUTO: 4.34 X10E6/UL (ref 3.6–5.1)
SODIUM SERPL-SCNC: 133 MMOL/L (ref 136–145)

## 2020-04-01 RX ADMIN — CARBIDOPA AND LEVODOPA SCH EA: 10; 100 TABLET ORAL at 20:38

## 2020-04-01 RX ADMIN — SIMVASTATIN SCH MG: 40 TABLET, FILM COATED ORAL at 20:38

## 2020-04-01 RX ADMIN — SODIUM CHLORIDE SCH MLS/HR: 9 INJECTION, SOLUTION INTRAVENOUS at 14:00

## 2020-04-01 RX ADMIN — HYDROCODONE BITARTRATE AND ACETAMINOPHEN PRN EA: 5; 325 TABLET ORAL at 17:35

## 2020-04-01 RX ADMIN — CARBIDOPA AND LEVODOPA SCH EA: 10; 100 TABLET ORAL at 09:00

## 2020-04-01 RX ADMIN — AMLODIPINE BESYLATE SCH MG: 10 TABLET ORAL at 09:00

## 2020-04-01 RX ADMIN — ASPIRIN 81 MG CHEWABLE TABLET SCH MG: 81 TABLET CHEWABLE at 09:00

## 2020-04-01 RX ADMIN — PANTOPRAZOLE SODIUM SCH MG: 40 TABLET, DELAYED RELEASE ORAL at 08:00

## 2020-04-01 RX ADMIN — EZETIMIBE SCH MG: 10 TABLET ORAL at 09:00

## 2020-04-01 RX ADMIN — Medication SCH MG: at 09:00

## 2020-04-01 RX ADMIN — TIMOLOL MALEATE SCH DROP: 5 SOLUTION OPHTHALMIC at 09:00

## 2020-04-01 RX ADMIN — Medication SCH EA: at 09:00

## 2020-04-01 RX ADMIN — CEFTRIAXONE SCH MLS/HR: 100 INJECTION, POWDER, FOR SOLUTION INTRAVENOUS at 12:22

## 2020-04-01 RX ADMIN — LORAZEPAM SCH MG: 0.5 TABLET ORAL at 09:00

## 2020-04-01 RX ADMIN — SODIUM CHLORIDE TAB 1 GM SCH GM: 1 TAB at 09:00

## 2020-04-01 RX ADMIN — LORAZEPAM SCH MG: 0.5 TABLET ORAL at 15:00

## 2020-04-01 RX ADMIN — CARBIDOPA AND LEVODOPA SCH EA: 10; 100 TABLET ORAL at 15:00

## 2020-04-01 RX ADMIN — SODIUM CHLORIDE TAB 1 GM SCH GM: 1 TAB at 15:00

## 2020-04-01 RX ADMIN — METOPROLOL TARTRATE SCH MG: 25 TABLET, FILM COATED ORAL at 09:00

## 2020-04-01 RX ADMIN — SODIUM CHLORIDE TAB 1 GM SCH GM: 1 TAB at 20:38

## 2020-04-01 RX ADMIN — OXYCODONE HYDROCHLORIDE AND ACETAMINOPHEN SCH MG: 500 TABLET ORAL at 09:00

## 2020-04-01 RX ADMIN — LORAZEPAM PRN MG: 2 INJECTION INTRAMUSCULAR; INTRAVENOUS at 02:24

## 2020-04-01 RX ADMIN — Medication SCH DROP: at 21:00

## 2020-04-01 RX ADMIN — METOPROLOL TARTRATE SCH MG: 25 TABLET, FILM COATED ORAL at 20:37

## 2020-04-01 RX ADMIN — LORAZEPAM SCH MG: 0.5 TABLET ORAL at 20:38

## 2020-04-01 RX ADMIN — CITALOPRAM HYDROBROMIDE SCH MG: 20 TABLET, FILM COATED ORAL at 09:00

## 2020-04-01 NOTE — NUR
Called to room by CNA, Noted patient restless and holding benedict in hand. No blood noted, 
ballon and catheter intact. No c/o pain or issues noted. 200 cc drained from benedict, brief 
applied. Due to void. Monitoring closely.Will notify MD regarding patient removing benedict 
catheter in morning. Bed alarm on and active.

## 2020-04-02 VITALS — DIASTOLIC BLOOD PRESSURE: 60 MMHG | SYSTOLIC BLOOD PRESSURE: 115 MMHG

## 2020-04-02 VITALS — DIASTOLIC BLOOD PRESSURE: 89 MMHG | SYSTOLIC BLOOD PRESSURE: 144 MMHG

## 2020-04-02 VITALS — SYSTOLIC BLOOD PRESSURE: 126 MMHG | DIASTOLIC BLOOD PRESSURE: 58 MMHG

## 2020-04-02 VITALS — SYSTOLIC BLOOD PRESSURE: 144 MMHG | DIASTOLIC BLOOD PRESSURE: 89 MMHG

## 2020-04-02 VITALS — SYSTOLIC BLOOD PRESSURE: 176 MMHG | DIASTOLIC BLOOD PRESSURE: 87 MMHG

## 2020-04-02 VITALS — SYSTOLIC BLOOD PRESSURE: 106 MMHG | DIASTOLIC BLOOD PRESSURE: 65 MMHG

## 2020-04-02 VITALS — SYSTOLIC BLOOD PRESSURE: 161 MMHG | DIASTOLIC BLOOD PRESSURE: 91 MMHG

## 2020-04-02 LAB
ANION GAP SERPL CALC-SCNC: 12.3 MMOL/L (ref 8–16)
BASOPHILS # BLD AUTO: 0 10*3/UL (ref 0–0.1)
BASOPHILS NFR BLD AUTO: 0.4 % (ref 0–1)
BUN SERPL-MCNC: 13 MG/DL (ref 7–26)
BUN/CREAT SERPL: 20 (ref 6–25)
CALCIUM SERPL-MCNC: 8.8 MG/DL (ref 8.4–10.2)
CHLORIDE SERPL-SCNC: 103 MMOL/L (ref 98–107)
CO2 SERPL-SCNC: 22 MMOL/L (ref 22–29)
DEPRECATED NEUTROPHILS # BLD AUTO: 7.1 10*3/UL (ref 2.1–6.9)
EGFRCR SERPLBLD CKD-EPI 2021: > 60 ML/MIN (ref 60–?)
EOSINOPHIL # BLD AUTO: 0.1 10*3/UL (ref 0–0.4)
EOSINOPHIL NFR BLD AUTO: 1.5 % (ref 0–6)
ERYTHROCYTE [DISTWIDTH] IN CORD BLOOD: 14 % (ref 11.7–14.4)
GLUCOSE SERPLBLD-MCNC: 87 MG/DL (ref 74–118)
HCT VFR BLD AUTO: 39.8 % (ref 34.2–44.1)
HGB BLD-MCNC: 12.9 G/DL (ref 12–16)
LYMPHOCYTES # BLD: 1 10*3/UL (ref 1–3.2)
LYMPHOCYTES NFR BLD AUTO: 10 % (ref 18–39.1)
MAGNESIUM SERPL-MCNC: 1.9 MG/DL (ref 1.3–2.1)
MCH RBC QN AUTO: 28.8 PG (ref 28–32)
MCHC RBC AUTO-ENTMCNC: 32.4 G/DL (ref 31–35)
MCV RBC AUTO: 88.8 FL (ref 81–99)
MONOCYTES # BLD AUTO: 1.2 10*3/UL (ref 0.2–0.8)
MONOCYTES NFR BLD AUTO: 12.5 % (ref 4.4–11.3)
NEUTS SEG NFR BLD AUTO: 75.3 % (ref 38.7–80)
PLATELET # BLD AUTO: 173 X10E3/UL (ref 140–360)
POTASSIUM SERPL-SCNC: 4.3 MMOL/L (ref 3.5–5.1)
RBC # BLD AUTO: 4.48 X10E6/UL (ref 3.6–5.1)
SODIUM SERPL-SCNC: 133 MMOL/L (ref 136–145)

## 2020-04-02 RX ADMIN — HYDROCODONE BITARTRATE AND ACETAMINOPHEN PRN EA: 5; 325 TABLET ORAL at 17:23

## 2020-04-02 RX ADMIN — AMLODIPINE BESYLATE SCH MG: 10 TABLET ORAL at 09:00

## 2020-04-02 RX ADMIN — LORAZEPAM PRN MG: 2 INJECTION INTRAMUSCULAR; INTRAVENOUS at 11:08

## 2020-04-02 RX ADMIN — CARBIDOPA AND LEVODOPA SCH EA: 10; 100 TABLET ORAL at 09:00

## 2020-04-02 RX ADMIN — SIMVASTATIN SCH MG: 40 TABLET, FILM COATED ORAL at 20:49

## 2020-04-02 RX ADMIN — SODIUM CHLORIDE SCH MLS/HR: 9 INJECTION, SOLUTION INTRAVENOUS at 09:33

## 2020-04-02 RX ADMIN — PANTOPRAZOLE SODIUM SCH MG: 40 TABLET, DELAYED RELEASE ORAL at 17:24

## 2020-04-02 RX ADMIN — CARBIDOPA AND LEVODOPA SCH EA: 10; 100 TABLET ORAL at 15:00

## 2020-04-02 RX ADMIN — EZETIMIBE SCH MG: 10 TABLET ORAL at 09:00

## 2020-04-02 RX ADMIN — SODIUM CHLORIDE TAB 1 GM SCH GM: 1 TAB at 15:00

## 2020-04-02 RX ADMIN — TIMOLOL MALEATE SCH DROP: 5 SOLUTION OPHTHALMIC at 09:00

## 2020-04-02 RX ADMIN — CITALOPRAM HYDROBROMIDE SCH MG: 20 TABLET, FILM COATED ORAL at 17:24

## 2020-04-02 RX ADMIN — SODIUM CHLORIDE TAB 1 GM SCH GM: 1 TAB at 09:00

## 2020-04-02 RX ADMIN — CARBIDOPA AND LEVODOPA SCH EA: 10; 100 TABLET ORAL at 20:48

## 2020-04-02 RX ADMIN — OXYCODONE HYDROCHLORIDE AND ACETAMINOPHEN SCH MG: 500 TABLET ORAL at 09:00

## 2020-04-02 RX ADMIN — ASPIRIN 81 MG CHEWABLE TABLET SCH MG: 81 TABLET CHEWABLE at 09:00

## 2020-04-02 RX ADMIN — METOPROLOL TARTRATE SCH MG: 25 TABLET, FILM COATED ORAL at 09:00

## 2020-04-02 RX ADMIN — ASPIRIN 81 MG CHEWABLE TABLET SCH MG: 81 TABLET CHEWABLE at 17:24

## 2020-04-02 RX ADMIN — SODIUM CHLORIDE TAB 1 GM SCH GM: 1 TAB at 20:49

## 2020-04-02 RX ADMIN — METOPROLOL TARTRATE SCH MG: 25 TABLET, FILM COATED ORAL at 20:47

## 2020-04-02 RX ADMIN — SODIUM CHLORIDE SCH MLS/HR: 9 INJECTION, SOLUTION INTRAVENOUS at 20:49

## 2020-04-02 RX ADMIN — EZETIMIBE SCH MG: 10 TABLET ORAL at 17:25

## 2020-04-02 RX ADMIN — Medication SCH DROP: at 21:00

## 2020-04-02 RX ADMIN — CEFTRIAXONE SCH MLS/HR: 100 INJECTION, POWDER, FOR SOLUTION INTRAVENOUS at 13:00

## 2020-04-02 RX ADMIN — Medication SCH EA: at 09:00

## 2020-04-02 RX ADMIN — Medication SCH MG: at 09:00

## 2020-04-02 RX ADMIN — PANTOPRAZOLE SODIUM SCH MG: 40 TABLET, DELAYED RELEASE ORAL at 07:30

## 2020-04-02 RX ADMIN — AMLODIPINE BESYLATE SCH MG: 10 TABLET ORAL at 17:25

## 2020-04-02 NOTE — CONSULTATION
DATE OF CONSULTATION:  04/01/2020

 

Psychiatric Consultation

 

The patient evaluated and events noted.

 

REASON FOR CONSULTATION:  To evaluate the patient's psychosis.

 

HISTORY OF PRESENT ILLNESS:  The patient is an 85-year-old  female, admitted to

the hospital for right hip pain.  Psychiatric consultation is called to evaluate the

patient's psychosis and mood. 

 

Upon evaluation today, the patient is found to be in the room.  She is drowsy and hard

of hearing.  She is alert, awake, and oriented to self.  She thinks she is at her

assisted living facility.  She does not know the year.  She denies any depression.

Denies any anxiety.  She reports intermittent sleep issues.  She denies any

hallucination.  She denies any suicidal ideation or homicidal ideation.  She reports

intermittent problem with appetite. 

 

As per nurse, the patient has been calm, but as per nurse report, the patient's appetite

has been poor. 

 

According to medical history, the patient has a history of Parkinson's, dementia,

anxiety, and GERD. 

 

PAST PSYCHIATRIC HISTORY:  The patient has a history of dementia, anxiety, and

Parkinson's.  Details not available and the patient is unable to answer questions due to

her mentation. 

 

FAMILY HISTORY:  Unknown.

 

SOCIAL HISTORY:  The patient lives at an assisted living facility.

 

MENTAL STATUS EXAM:  The patient is an elderly  female.  She is alert, awake,

and oriented to self.  Affect is blunt.  Mood is anxious.  She denies any suicidal or

homicidal ideation.  Denies any hallucination.  Thought process is loose.  Insight and

judgment are limited to poor.  Memory appears to be grossly impaired. 

 

CURRENT MEDICATIONS:  

1. Ativan 0.5 mg p.o. 3 times a day schedule.

2. Ativan 0.5 mg IV q.6 hours p.r.n.

3. Sodium chloride.

4. Simvastatin.

5. Sinemet.

6. Norco.

7. Ceftriaxone.

8. Norvasc.

9. Folic acid.

10. Ferrous sulfate.

11. Aspirin.

12. Ascorbic acid.

13. Timolol.

14. Ezetimibe.

15. Celexa 30 mg p.o. daily.

16. Protonix.

17. Travatan.

18. Hydralazine.

19. Metoprolol.

20. Magnesium aluminum silicate.

21. Nitroglycerin.

22. Albuterol/ipratropium.

23. Ondansetron.

24. Acetaminophen.

25. Morphine.

 

CURRENT LABORATORY DATA:  WBC 11.83, RBC 4.34, hemoglobin 12.5, hematocrit 38.2,

platelets 182.  Sodium 133, potassium 3.3, chloride 101, CO2 22, BUN 14. 

 

ASSESSMENT:  

1. Unspecified by dementia with behavior disturbances.

2. Unspecified psychosis.

3. History of anxiety.

 

PLAN:  

1. Add Zyprexa 2.5 mg p.o. q.6 hours p.r.n.

2. Add Haldol 1 mg IM q.6 hours p.r.n.

3. Continue with Ativan p.r.n. IV.

4. Reduce Ativan 0.5 mg p.o. 3 times to 0.5 mg p.o. twice a day.

5. Continue Celexa daily by plan to reduce it as possible.

6. Monitor for mood and agitation. 

Thank you for this consultation.

 

 

Dictated by Komal Black PA-C

 

______________________________

MD MARK EastonV/UMER

D:  04/01/2020 21:12:50

T:  04/01/2020 21:39:49

Job #:  [id number]

## 2020-04-02 NOTE — NUR
Received report from day nurse. patient is resting quietly in the bed. patient has a pure 
whick attached to wall suction. patient is on O2 via the nasal cannula. respirations are 
even and unlabored. no complaints of pain or discomfort noted. will continue to monitor 
patient.

## 2020-04-02 NOTE — NUR
Nutrition Intervention Note



RD Recommendation(s) for Physician: 

- Continue current diet per SLP 

- Continue Ensure Compact TID for adequacy

- MVI with minerals once daily for adequacy 



Plan of Care: RD following, monitoring for tolerance and adequacy 



Nutrition reason for involvement: follow up



RD Assessment

4/2: Follow up. Per RN, pt is very agitated and did not eat much today. Yesterday, RN 
reported that pt ate well. Recommend to continue Ensure Compact TID for added nutrition. 
Will continue to monitor. 



3/31: 85 YOF admitted s/p fall with R hip pain and probable fx. Pt seen today per MST 
screen, pt sleeping and per chart poor historian 2/2 dementia. Pt appears well nourished, no 
family present at bedside. Spoke with RN Ness, pt seen by SLP today and she recommended 
pureed diet. Per RN pt not eating well, but will drink liquids- recommended Ensure Compat 
TID, RN to order. Chart reviewed. Will continue to monitor. 



Principal Problems/Diagnoses: R hip pain s/p fall



PMH: Parkinson's, dementia, GERD 



GI: LBM 4/1



Skin: skin intact 



Labs:  4/2: Na 133, K 4.3, BUN 13, Cr 0.65, Glu 87

3/31: Na 132, K 3.6, BUN 11, Cr 0.74, Gluc 93, A1C 4.9



Meds: hydralazine, Vitamin C, metoprolol, zofran



Ht: 60 in     

Wt: 147 lb     

BMI: 28.7 kg/m2     

IBW: 100 lb



Malnutrition Evaluation (3/31/20)

The patient does not meet criteria for a specified degree of malnutrition at this time. Will 
re-evaluate at follow-up as appropriate. 

Unable to complete assessment, pt unable to provide hx. 



Energy intake:

Unable to assess

Weight loss:

Unable to assess

Fat loss: none, ample tricept fold thickness 

Muscle loss: none, shoulder round- age related changes noted

Supporting Evidence:

Fluid accumulation: none

Functional Status: not assessed



Nutrition Prescription (Diet Order): Pureed 



Estimated Nutritional Needs:

7998-6972 calories/day (18-22 kcal/kg CBW)

 g protein/day (1-1.5 g pro/kg CBW)





Diet Adequacy:

Not meeting calorie needs, Not meeting protein needs

Diet Tolerance: tolerating po 



Diet Education Needs Assessment: 

Diet education not indicated at this time, modified texture diet and pt with hx of dementia 





Nutrition Care Level: Moderate



Nutrition Diagnosis: Inadequate energy and protein intake related to current medical 
conditions as evidenced by not meeting needs. 





Goal: Patient will meet % of estimated needs by follow up 

Progress: not progressing



Interventions:

- texture modified diet, Commercial beverage, Multivitamin/mineral therapy, Collaboration 
with other providers



Monitoring/Evaluation:

- Total energy intake, Total protein intake, Modified diet, Liquid supplement, Weight change





Signed: eTreza Bullock RD, LD

## 2020-04-03 VITALS — SYSTOLIC BLOOD PRESSURE: 120 MMHG | DIASTOLIC BLOOD PRESSURE: 56 MMHG

## 2020-04-03 VITALS — SYSTOLIC BLOOD PRESSURE: 137 MMHG | DIASTOLIC BLOOD PRESSURE: 60 MMHG

## 2020-04-03 VITALS — DIASTOLIC BLOOD PRESSURE: 56 MMHG | SYSTOLIC BLOOD PRESSURE: 120 MMHG

## 2020-04-03 VITALS — SYSTOLIC BLOOD PRESSURE: 114 MMHG | DIASTOLIC BLOOD PRESSURE: 58 MMHG

## 2020-04-03 VITALS — SYSTOLIC BLOOD PRESSURE: 112 MMHG | DIASTOLIC BLOOD PRESSURE: 57 MMHG

## 2020-04-03 RX ADMIN — CITALOPRAM HYDROBROMIDE SCH MG: 20 TABLET, FILM COATED ORAL at 09:39

## 2020-04-03 RX ADMIN — AMLODIPINE BESYLATE SCH MG: 10 TABLET ORAL at 09:39

## 2020-04-03 RX ADMIN — CARBIDOPA AND LEVODOPA SCH EA: 10; 100 TABLET ORAL at 09:39

## 2020-04-03 RX ADMIN — ASPIRIN 81 MG CHEWABLE TABLET SCH MG: 81 TABLET CHEWABLE at 09:39

## 2020-04-03 RX ADMIN — SODIUM CHLORIDE TAB 1 GM SCH GM: 1 TAB at 15:19

## 2020-04-03 RX ADMIN — METOPROLOL TARTRATE SCH MG: 25 TABLET, FILM COATED ORAL at 09:39

## 2020-04-03 RX ADMIN — PANTOPRAZOLE SODIUM SCH MG: 40 TABLET, DELAYED RELEASE ORAL at 08:15

## 2020-04-03 RX ADMIN — SODIUM CHLORIDE TAB 1 GM SCH GM: 1 TAB at 09:39

## 2020-04-03 RX ADMIN — CARBIDOPA AND LEVODOPA SCH EA: 10; 100 TABLET ORAL at 15:19

## 2020-04-03 RX ADMIN — OXYCODONE HYDROCHLORIDE AND ACETAMINOPHEN SCH MG: 500 TABLET ORAL at 09:39

## 2020-04-03 RX ADMIN — EZETIMIBE SCH MG: 10 TABLET ORAL at 09:39

## 2020-04-03 RX ADMIN — Medication SCH MG: at 09:39

## 2020-04-03 RX ADMIN — Medication SCH EA: at 09:39

## 2020-04-03 RX ADMIN — HYDROCODONE BITARTRATE AND ACETAMINOPHEN PRN EA: 5; 325 TABLET ORAL at 05:29

## 2020-04-03 RX ADMIN — TIMOLOL MALEATE SCH DROP: 5 SOLUTION OPHTHALMIC at 09:39

## 2020-04-03 RX ADMIN — CEFTRIAXONE SCH MLS/HR: 100 INJECTION, POWDER, FOR SOLUTION INTRAVENOUS at 13:16

## 2020-04-03 NOTE — PROGRESS NOTE
DATE:  04/03/2020  

 

SUBJECTIVE:  The patient is evaluated and events none.  The patient is in the room.  She

is alert, awake, and oriented to situation.  She is doing better, less manic.  She is

not paranoid.  She denies any depression.  Denies anxiety.  She denies any

hallucination.  She denies any side effects to medication.  She is calmer. 

 

ASSESSMENT:  

1. Unspecified psychosis.

2. Unspecified dementia with behavioral disturbances.

3. History of anxiety.

 

PLAN:  

1. Continue Zyprexa 2.5 mg p.o. at bedtime.

2. Continue p.r.n. Zyprexa.

3. Monitor for mood.

4. Supportive therapy.

5. Discussed with Medico, NP.

6. Continue other medication from a psych.

 

 

Dictated by Komal Black PA-C

 

______________________________

MD GERA Edmondson/UMER

D:  04/03/2020 17:47:49

T:  04/03/2020 18:40:01

Job #:  [id number]

## 2020-04-03 NOTE — NUR
patient iv is leaking. iv has been taken out and replaced with a 22 gauge iv. patient 
tolerated procedure well. iv is patent. dressing applied to iv is clean, dry and intact.

## 2020-04-03 NOTE — NUR
PATIENT TRANSFERRED TO SNF. REPORT GIVEN TO RECEIVING NURSE. IV TO RIGHT WRIST INTACT AND 
PATENT. ALL PERSONAL ITEMS TAKEN WITH PATIENT. PATIENT'S SON AND DAUGHTER NOTIFIED OF 
TRANSFER. LEFT UNIT ON STRETCHER PER AMBULANCE.

## 2020-04-03 NOTE — NUR
PATIENT ASSISTED WITH FEEDING, NO SWALLOWING DIFFICULTY OBSERVED. REPOSITIONED IN BED. CALL 
LIGHT AT REACH.

## 2020-04-03 NOTE — DISCHARGE SUMMARY
PERTINENT HISTORY AND PHYSICAL FINDINGS/CHIEF COMPLAINT:  Broken hip.

 

HISTORY OF PRESENT ILLNESS:  The patient is an 85-year-old female, admitted with

complaints of a right hip pain.  She admits from [facility] and fell

while getting out of bed.  Hip x-ray showed acute fracture of the right inferior pubic

ramus.  The history of present illness and history are from the nursing home record and

from the patient's daughter. 

 

PAST MEDICAL HISTORY:  COPD, gastroesophageal reflux disease, depression, anemia,

hyperlipidemia, dementia, anxiety, glaucoma, hypertension, atrial fibrillation,

peripheral vascular disease, and heart failure of unknown type. 

 

PAST SURGICAL HISTORY:  PCI left lower extremity, cholecystectomy, pessary, knee

surgery, AAA repair, abdominal aortic aneurysm, and appendectomy. 

 

FAMILY HISTORY:  Noncontributory.

 

SOCIAL HISTORY:  Quit alcohol and tobacco use eight years ago.

 

ALLERGIES:  NO KNOWN ALLERGIES.

 

ADMITTING DIAGNOSES:  

1. Acute right inferior pubic ramus fracture.

2. Dementia.

3. Anxiety/depression.

4. Glaucoma.

5. Hypertension.

6. Atrial fibrillation.

 

DISCHARGE DIAGNOSES:  

1. Acute right inferior pubic ramus fracture.

2. Dementia, Parkinson's, agitation/combative, improved.

3. Acute Escherichia coli urinary tract infection, present on arrival.

4. Chronic systolic congestive heart failure with aortic stenosis and aortic

regurgitation with ejection fraction 30%. 

5. Uncontrolled hypertension.

6. Chronic obstructive pulmonary disease without exacerbation with probable

SIADH/chronic hyponatremia. 

7. Gastroesophageal reflux disease.

8. Status post fall at the nursing home.

 

HOSPITAL COURSE:  On admission, WBCs 13.24, hemoglobin 13.7, hematocrit 42.4, and

platelets 240.  Sodium 134, potassium 4.4, chloride 97, CO2 of 28, BUN 13, creatinine

0.84, and GFR greater than 60.  Calcium 9.5.  LFTs were within normal limits.  B type

nitrate peptide 315.4.  Her hemoglobin A1c was 4.9% on April 1st.  Magnesium slightly

low at 1.7.  Phosphorus was 2.4.  Her urinalysis showed trace protein, trace ketones, 1+

blood, 1+ leukocyte esterase, 6-10 RBCs, 11-20 WBCs, and many bacteria.  Her final urine

culture showed E coli that is sensitive to Rocephin.  It is resistant to Levaquin and

resistant to Bactrim.  The patient's hip x-ray did show an acute fracture of the right

inferior pubic ramus.  The chest x-ray showed left basilar opacity could be due to

combination of atelectasis or effusion.  Lumbar spine CT showed no acute lumbar spine

fracture, nondisplaced right lateral sacral fracture, degenerative canal stenosis,

moderate T11 and T12, severe T4-L5 and moderate L5-S1 advanced multilevel degenerative

changes with advanced multilevel disk degeneration, facet arthrosis and multilevel

degenerative listhesis.  CT of the abdomen and pelvis without contrast showed mildly

displaced fracture of the right inferior pubic ramus.  The patient was seen by

orthopedic physician, Dr. Brooks.  Per his note, if CT demonstrates only superior and

inferior pubic ramus fracture, the patient is weightbearing as tolerated, analgesics,

DVT prophylaxis, plan for physical therapy as soon as possible.  Per documentation by

Physical Therapy, the patient was seen on April 2nd and the patient had poor safety

awareness.  She was assisted with bed mobility and with wheelchair transfers.  However,

the patient was not able to be left sitting up on her own due disorientation, showed a

poor response to education.  She does complain of right hip pain due to the right

inferior pubic ramus fracture since her fall.  There were times when the patient was

very agitated and combative, Ativan was used p.r.n. and Psychiatry was consulted.  Per

the Psychiatry consultation note by Komal Black, assessment included unspecified dementia

with behavioral disturbances, unspecified psychosis, and history of anxiety.  Plan was

to add Zyprexa 2.5 mg p.o. every 6 hours p.r.n., add Haldol 1 mg IM every 6 hours p.r.n.

 Continue with Ativan p.r.n. IV.  Reduce Ativan 0.5 mg p.o. t.i.d. to 0.5 mg p.o. b.i.d.

 Continue the Celexa daily by plan and plan to reduce it as possible.  The patient's

mood is much better.  A call was placed to Dr. Brooks to further discuss the case,

awaiting return call.  The patient was agitated today, not currently screaming at staff

or combative.  Today's vital signs, temperature 96.4 with T-max 97.6, heart rate 60,

blood pressure 114/58, respirations 16, and oxygen saturation 96%.  Most recent labs on

April 2nd, sodium 133, potassium 4.3, chloride 103, CO2 of 22, BUN 13, creatinine 0.65,

and glucose 87.  WBCs 9.46, hemoglobin 12.9, hematocrit 39.8, and platelets 173.  The

patient is to continue with regular diet.  Activity level as tolerated.  Weightbearing

as tolerated.  We will continue her IV Rocephin at the nursing home for about 5 more

days.  She did have an echocardiogram done on March 30th, which showed an ejection

fraction estimated to be about 30%, aortic stenosis, aortic regurgitation, pleural

effusion.  The patient was combative at the time 

the study was taken.  The patient to follow up with her PCP, Dr. STEFAN Manjarrez and her

accepting physician at the nursing home. 

 

 

Dictated by Corey Sandoval NP

 

______________________________

MD HUGO EdmondsonP/MODL

D:  04/03/2020 12:17:57

T:  04/03/2020 13:23:37

Job #:  [id number]

## 2020-04-03 NOTE — NUR
patient is resting in bed. bed is in lowest position and call light is within reach. denies 
pain or discomfort.

## 2020-04-03 NOTE — NUR
PATIENT IN BED RESTING WITH NO S/S OF DISCOMFORT. O2 IN PLACE VIA N/C. BED IN LOWER 
POSITION, CALL LIGHT AT REACH.

## 2020-04-06 NOTE — PROGRESS NOTE
DATE:  04/02/2020

 

Psychiatric Progress Note 

 

Dictating for Fox Thomason MD

 

SUBJECTIVE:  The patient is evaluated and events noted.  The patient is in room.  She is

alert, awake, and oriented to situation.  She is calm and cooperative.  She is alert,

awake, and oriented to self.  She does not know where she is.  She is not calm.  She is

agitated.  She is paranoid and manic.  She thinks people are hurting her.  She is hyper

Buddhist.  She claims that she is depressed because she wants to go home.  She denies

any hallucination.  She denies any suicidal ideation.  She is taking her medication, no

side effects are seen. 

 

As per nurse, the patient has been agitated, paranoid, not eating.  She is not combative.

 

ASSESSMENT:  

1. Unspecified psychosis.

2. Unspecified dementia with behavior disturbances.

3. History of anxiety.

 

PLAN:  

1. Continue Zyprexa p.r.n.

2. Continue Haldol p.r.n. IM.

3. Continue Ativan p.r.n. IV.

4. DC Ativan 0.5 mg p.o. b.i.d.

5. Reduce Celexa.

6. Add Zyprexa 2.5 mg p.o. at bedtime.

7. Monitor for agitation and mood.

8. Discussed with nursing staff.

 

 

Dictated by Komal Black PA-C

 

______________________________

Fox Thomason MD

 

QTV/MODL

D:  04/02/2020 10:24:43

T:  04/02/2020 11:52:23

Job #:  [id number]

## 2021-04-26 ENCOUNTER — HOSPITAL ENCOUNTER (EMERGENCY)
Dept: HOSPITAL 88 - ER | Age: 86
Discharge: OTHER INSTITUTION | End: 2021-04-26
Payer: MEDICARE

## 2021-04-26 VITALS — HEIGHT: 72 IN | BODY MASS INDEX: 16.25 KG/M2 | WEIGHT: 120 LBS

## 2021-04-26 DIAGNOSIS — E86.0: ICD-10-CM

## 2021-04-26 DIAGNOSIS — I48.91: ICD-10-CM

## 2021-04-26 DIAGNOSIS — Z20.822: ICD-10-CM

## 2021-04-26 DIAGNOSIS — E87.0: ICD-10-CM

## 2021-04-26 DIAGNOSIS — R53.1: Primary | ICD-10-CM

## 2021-04-26 DIAGNOSIS — I47.1: ICD-10-CM

## 2021-04-26 DIAGNOSIS — N39.0: ICD-10-CM

## 2021-04-26 DIAGNOSIS — R94.31: ICD-10-CM

## 2021-04-26 DIAGNOSIS — R94.4: ICD-10-CM

## 2021-04-26 LAB
ALBUMIN SERPL-MCNC: 3.2 G/DL (ref 3.5–5)
ALBUMIN/GLOB SERPL: 0.8 {RATIO} (ref 0.8–2)
ALP SERPL-CCNC: 88 IU/L (ref 40–150)
ALT SERPL-CCNC: 34 IU/L (ref 0–55)
ANION GAP SERPL CALC-SCNC: 13.9 MMOL/L (ref 8–16)
BACTERIA URNS QL MICRO: (no result) /HPF
BASOPHILS # BLD AUTO: 0.1 10*3/UL (ref 0–0.1)
BASOPHILS NFR BLD AUTO: 0.4 % (ref 0–1)
BNP BLD-MCNC: 80.7 PG/ML (ref 0–100)
BUN SERPL-MCNC: 76 MG/DL (ref 7–26)
BUN/CREAT SERPL: 52 (ref 6–25)
CALCIUM SERPL-MCNC: 8.8 MG/DL (ref 8.4–10.2)
CHLORIDE SERPL-SCNC: 128 MMOL/L (ref 98–107)
CK MB SERPL-MCNC: 0.6 NG/ML (ref 0–5)
CK SERPL-CCNC: 274 IU/L (ref 29–168)
CLARITY UR: (no result)
CO2 SERPL-SCNC: 26 MMOL/L (ref 22–29)
COLOR UR: YELLOW
DEPRECATED APTT PLAS QN: 35.2 SECONDS (ref 23.8–35.5)
DEPRECATED INR PLAS: 1
DEPRECATED NEUTROPHILS # BLD AUTO: 9.6 10*3/UL (ref 2.1–6.9)
DEPRECATED RBC URNS MANUAL-ACNC: (no result) /HPF (ref 0–5)
EGFRCR SERPLBLD CKD-EPI 2021: 34 ML/MIN (ref 60–?)
EOSINOPHIL # BLD AUTO: 0 10*3/UL (ref 0–0.4)
EOSINOPHIL NFR BLD AUTO: 0.2 % (ref 0–6)
EPI CELLS URNS QL MICRO: (no result) /LPF
ERYTHROCYTE [DISTWIDTH] IN CORD BLOOD: 14.6 % (ref 11.7–14.4)
GLOBULIN PLAS-MCNC: 4.2 G/DL (ref 2.3–3.5)
GLUCOSE SERPLBLD-MCNC: 105 MG/DL (ref 74–118)
HCT VFR BLD AUTO: 48.1 % (ref 34.2–44.1)
HGB BLD-MCNC: 14.5 G/DL (ref 12–16)
KETONES UR QL STRIP.AUTO: (no result)
LEUKOCYTE ESTERASE UR QL STRIP.AUTO: (no result)
LYMPHOCYTES # BLD: 2.8 10*3/UL (ref 1–3.2)
LYMPHOCYTES NFR BLD AUTO: 20.9 % (ref 18–39.1)
MAGNESIUM SERPL-MCNC: 3.2 MG/DL (ref 1.3–2.1)
MCH RBC QN AUTO: 27.3 PG (ref 28–32)
MCHC RBC AUTO-ENTMCNC: 30.1 G/DL (ref 31–35)
MCV RBC AUTO: 90.4 FL (ref 81–99)
MONOCYTES # BLD AUTO: 1 10*3/UL (ref 0.2–0.8)
MONOCYTES NFR BLD AUTO: 7.2 % (ref 4.4–11.3)
NEUTS SEG NFR BLD AUTO: 70.9 % (ref 38.7–80)
NITRITE UR QL STRIP.AUTO: NEGATIVE
PLATELET # BLD AUTO: 334 X10E3/UL (ref 140–360)
POTASSIUM SERPL-SCNC: 3.9 MMOL/L (ref 3.5–5.1)
PROT UR QL STRIP.AUTO: (no result)
PROTHROMBIN TIME: 13.8 SECONDS (ref 11.9–14.5)
RBC # BLD AUTO: 5.32 X10E6/UL (ref 3.6–5.1)
SODIUM SERPL-SCNC: 164 MMOL/L (ref 136–145)
SP GR UR STRIP: 1.02 (ref 1.01–1.02)
UROBILINOGEN UR STRIP-MCNC: 0.2 MG/DL (ref 0.2–1)

## 2021-04-26 PROCEDURE — 83605 ASSAY OF LACTIC ACID: CPT

## 2021-04-26 PROCEDURE — 36415 COLL VENOUS BLD VENIPUNCTURE: CPT

## 2021-04-26 PROCEDURE — 80053 COMPREHEN METABOLIC PANEL: CPT

## 2021-04-26 PROCEDURE — 82553 CREATINE MB FRACTION: CPT

## 2021-04-26 PROCEDURE — 83880 ASSAY OF NATRIURETIC PEPTIDE: CPT

## 2021-04-26 PROCEDURE — 93005 ELECTROCARDIOGRAM TRACING: CPT

## 2021-04-26 PROCEDURE — 70450 CT HEAD/BRAIN W/O DYE: CPT

## 2021-04-26 PROCEDURE — 85610 PROTHROMBIN TIME: CPT

## 2021-04-26 PROCEDURE — 81001 URINALYSIS AUTO W/SCOPE: CPT

## 2021-04-26 PROCEDURE — 87071 CULTURE AEROBIC QUANT OTHER: CPT

## 2021-04-26 PROCEDURE — 87040 BLOOD CULTURE FOR BACTERIA: CPT

## 2021-04-26 PROCEDURE — 87086 URINE CULTURE/COLONY COUNT: CPT

## 2021-04-26 PROCEDURE — 71045 X-RAY EXAM CHEST 1 VIEW: CPT

## 2021-04-26 PROCEDURE — 87205 SMEAR GRAM STAIN: CPT

## 2021-04-26 PROCEDURE — 85025 COMPLETE CBC W/AUTO DIFF WBC: CPT

## 2021-04-26 PROCEDURE — 99284 EMERGENCY DEPT VISIT MOD MDM: CPT

## 2021-04-26 PROCEDURE — 83735 ASSAY OF MAGNESIUM: CPT

## 2021-04-26 PROCEDURE — 82550 ASSAY OF CK (CPK): CPT

## 2021-04-26 PROCEDURE — 85730 THROMBOPLASTIN TIME PARTIAL: CPT

## 2021-04-26 PROCEDURE — 84484 ASSAY OF TROPONIN QUANT: CPT
